# Patient Record
Sex: MALE | Race: WHITE | URBAN - METROPOLITAN AREA
[De-identification: names, ages, dates, MRNs, and addresses within clinical notes are randomized per-mention and may not be internally consistent; named-entity substitution may affect disease eponyms.]

---

## 2020-10-01 ENCOUNTER — OFFICE VISIT (OUTPATIENT)
Dept: FAMILY MEDICINE CLINIC | Facility: CLINIC | Age: 60
End: 2020-10-01

## 2020-10-01 VITALS
WEIGHT: 143 LBS | OXYGEN SATURATION: 98 % | SYSTOLIC BLOOD PRESSURE: 132 MMHG | TEMPERATURE: 99 F | HEART RATE: 76 BPM | HEIGHT: 66 IN | RESPIRATION RATE: 18 BRPM | BODY MASS INDEX: 22.98 KG/M2 | DIASTOLIC BLOOD PRESSURE: 56 MMHG

## 2020-10-01 DIAGNOSIS — T15.92XA FOREIGN BODY OF LEFT EYE, INITIAL ENCOUNTER: Primary | ICD-10-CM

## 2020-10-01 PROCEDURE — 3008F BODY MASS INDEX DOCD: CPT | Performed by: NURSE PRACTITIONER

## 2020-10-01 PROCEDURE — 3075F SYST BP GE 130 - 139MM HG: CPT | Performed by: NURSE PRACTITIONER

## 2020-10-01 PROCEDURE — 3078F DIAST BP <80 MM HG: CPT | Performed by: NURSE PRACTITIONER

## 2020-10-01 PROCEDURE — 99202 OFFICE O/P NEW SF 15 MIN: CPT | Performed by: NURSE PRACTITIONER

## 2020-10-01 NOTE — PROGRESS NOTES
CHIEF COMPLAINT:   Patient presents with:  Eye Problem: x 1 day        HPI:   Debbie Hicks is a 64year old male who presents with chief complaint of \"eye problem\". Symptoms began today after he removed a shingle from the roof.   He thinks that he got EYES: PERRLA, EOMI, No eye redness, no discharge, no tearing,  no lid swelling.  No swelling or redness of periorbital tissue.  Vision not corrected - right eye : 20/40, left eye 20/40.  No abnormal dye uptake noted.  Flushed left eye and upper eyelid with The conjunctiva is a thin membrane in the eye. It covers the white of the eye and the inside of the eyelid. A very small object such as an eyelash or dirt can become trapped under the eyelid. This is called a conjunctival foreign body.  This can be very irr The patient verbalizes understanding and is in agreement with treatment plan.

## 2020-10-01 NOTE — PATIENT INSTRUCTIONS
Particle in the Eye  The conjunctiva is a thin membrane in the eye. It covers the white of the eye and the inside of the eyelid. A very small object such as an eyelash or dirt can become trapped under the eyelid.  This is called a conjunctival foreign b